# Patient Record
Sex: FEMALE | URBAN - METROPOLITAN AREA
[De-identification: names, ages, dates, MRNs, and addresses within clinical notes are randomized per-mention and may not be internally consistent; named-entity substitution may affect disease eponyms.]

---

## 2022-04-12 ENCOUNTER — APPOINTMENT (RX ONLY)
Dept: URBAN - METROPOLITAN AREA CLINIC 282 | Facility: CLINIC | Age: 42
Setting detail: DERMATOLOGY
End: 2022-04-12

## 2022-04-12 DIAGNOSIS — I78.1 NEVUS, NON-NEOPLASTIC: ICD-10-CM | Status: STABLE

## 2022-04-12 DIAGNOSIS — D22 MELANOCYTIC NEVI: ICD-10-CM | Status: STABLE

## 2022-04-12 PROBLEM — D22.9 MELANOCYTIC NEVI, UNSPECIFIED: Status: ACTIVE | Noted: 2022-04-12

## 2022-04-12 PROCEDURE — ? DEFER

## 2022-04-12 PROCEDURE — ? COUNSELING

## 2022-04-12 PROCEDURE — ? DIAGNOSIS COMMENT

## 2022-04-12 PROCEDURE — 99202 OFFICE O/P NEW SF 15 MIN: CPT

## 2022-04-12 ASSESSMENT — LOCATION ZONE DERM: LOCATION ZONE: FACE

## 2022-04-12 ASSESSMENT — LOCATION SIMPLE DESCRIPTION DERM: LOCATION SIMPLE: RIGHT FOREHEAD

## 2022-04-12 ASSESSMENT — LOCATION DETAILED DESCRIPTION DERM: LOCATION DETAILED: RIGHT FOREHEAD

## 2022-04-12 NOTE — PROCEDURE: DEFER
Detail Level: Detailed
Instructions (Optional): Schedule consult with ANGÉLICA
Introduction Text (Please End With A Colon): The following procedure was deferred: